# Patient Record
Sex: MALE | Race: WHITE | NOT HISPANIC OR LATINO | URBAN - METROPOLITAN AREA
[De-identification: names, ages, dates, MRNs, and addresses within clinical notes are randomized per-mention and may not be internally consistent; named-entity substitution may affect disease eponyms.]

---

## 2020-07-25 ENCOUNTER — TELEPHONE ENCOUNTER (OUTPATIENT)
Dept: URBAN - METROPOLITAN AREA CLINIC 13 | Facility: CLINIC | Age: 35
End: 2020-07-25

## 2020-07-25 RX ORDER — DIFENOXIN AND ATROPINE SULFATE .025; 1 MG/1; MG/1
TAKE 1 TABLET EVERY 4 TO 6 HOURS AS NEEDED TABLET ORAL
Qty: 40 | Refills: 1 | OUTPATIENT
Start: 2019-01-16 | End: 2019-02-06

## 2020-07-25 RX ORDER — MV,CA,MIN/IRON/FA/GUARANA/CAFF 9-400-200
TAKE 1 TABLET DAILY TABLET ORAL
Refills: 0 | OUTPATIENT
Start: 2009-05-21 | End: 2017-06-28

## 2020-07-25 RX ORDER — POLYETHYLENE GLYCOL 3350, SODIUM CHLORIDE, SODIUM BICARBONATE AND POTASSIUM CHLORIDE WITH LEMON FLAVOR 420; 11.2; 5.72; 1.48 G/4L; G/4L; G/4L; G/4L
USE AS DIRECTED POWDER, FOR SOLUTION ORAL
Qty: 1 | Refills: 0 | OUTPATIENT
Start: 2017-08-28 | End: 2017-10-02

## 2020-07-25 RX ORDER — RIFAXIMIN 550 MG/1
TAKE 1 TABLET 3 TIMES DAILY TABLET ORAL
Qty: 42 | Refills: 2 | OUTPATIENT
Start: 2018-11-15 | End: 2019-01-16

## 2020-07-25 RX ORDER — PANCRELIPASE 24000; 76000; 120000 [USP'U]/1; [USP'U]/1; [USP'U]/1
TK ONE C PO WITH THE FIRST BITES OF EACH MEAL CAPSULE, DELAYED RELEASE PELLETS ORAL
Qty: 120 | Refills: 0 | OUTPATIENT
Start: 2019-02-08 | End: 2019-09-25

## 2020-07-25 RX ORDER — DICYCLOMINE HYDROCHLORIDE 10 MG/1
TAKE 1 CAPSULE EVERY 6 HOURS AS NEEDED CAPSULE ORAL
Qty: 28 | Refills: 0 | OUTPATIENT
Start: 2017-06-28 | End: 2017-08-28

## 2020-07-25 RX ORDER — DICYCLOMINE HYDROCHLORIDE 20 MG/1
TAKE ONE TABLET BY MOUTH EVERY 6 HOURS AS NEEDED TABLET ORAL
Qty: 40 | Refills: 2 | OUTPATIENT
Start: 2018-12-21 | End: 2019-04-17

## 2020-07-25 RX ORDER — LISDEXAMFETAMINE DIMESYLATE 20 MG/1
TAKE 1 CAPSULE DAILY CAPSULE ORAL
Refills: 0 | OUTPATIENT
End: 2017-08-28

## 2020-07-25 RX ORDER — CHOLESTYRAMINE 4 G/9G
TAKE 1 PACKET DAILY AT BEDTIME POWDER, FOR SUSPENSION ORAL
Qty: 30 | Refills: 1 | OUTPATIENT
Start: 2019-09-14 | End: 2019-09-25

## 2020-07-25 RX ORDER — DICYCLOMINE HYDROCHLORIDE 10 MG/1
TAKE 1 CAPSULE EVERY 6 HOURS AS NEEDED CAPSULE ORAL
Qty: 40 | Refills: 0 | OUTPATIENT
Start: 2017-07-13 | End: 2018-12-21

## 2020-07-25 RX ORDER — AMITRIPTYLINE HYDROCHLORIDE 10 MG/1
TAKE 2 TABLETS BY MOUTH AT BEDTIME TABLET, FILM COATED ORAL
Qty: 60 | Refills: 2 | OUTPATIENT
Start: 2017-11-06 | End: 2019-01-16

## 2020-07-25 RX ORDER — BUSPIRONE HYDROCHLORIDE 5 MG/1
TAKE 1 TABLET TWICE DAILY TABLET ORAL
Qty: 60 | Refills: 2 | OUTPATIENT
Start: 2019-02-26 | End: 2019-03-14

## 2020-07-26 ENCOUNTER — TELEPHONE ENCOUNTER (OUTPATIENT)
Dept: URBAN - METROPOLITAN AREA CLINIC 13 | Facility: CLINIC | Age: 35
End: 2020-07-26

## 2020-07-26 RX ORDER — ARMODAFINIL 50 MG/1
TAKE 1 TABLET DAILY TABLET ORAL
Refills: 0 | Status: ACTIVE | COMMUNITY

## 2020-07-26 RX ORDER — IBUPROFEN 800 MG/1
TK 1 T PO Q 8 H PRN TABLET ORAL
Qty: 30 | Refills: 0 | Status: ACTIVE | COMMUNITY
Start: 2018-01-28

## 2020-07-26 RX ORDER — DESIPRAMINE 50 MG/1
TAKE 1/2 TABLET (25MG) PRIOR TO BEDTIME NIGHTLY X1 WEEK, THEN INCREASE TO 1 TABLET (50MG) NIGHTLY TABLET, FILM COATED ORAL
Qty: 30 | Refills: 5 | Status: ACTIVE | COMMUNITY
Start: 2019-09-25

## 2020-07-26 RX ORDER — PREDNISONE 20 MG/1
TAKE 1 TABLET BY MOUTH TWICE DAILY WITH FOOD TABLET ORAL
Qty: 10 | Refills: 0 | Status: ACTIVE | COMMUNITY
Start: 2018-01-30

## 2020-07-26 RX ORDER — BENZONATATE 200 MG/1
TAKE 1 CAPSULE BY MOUTH EVERY 8 HOURS AS NEEDED CAPSULE ORAL
Qty: 30 | Refills: 0 | Status: ACTIVE | COMMUNITY
Start: 2018-01-25

## 2020-07-26 RX ORDER — ARMODAFINIL 150 MG/1
TABLET ORAL
Qty: 30 | Refills: 0 | Status: ACTIVE | COMMUNITY
Start: 2019-09-12

## 2020-07-26 RX ORDER — HYOSCYAMINE SULFATE 0.12 MG/1
TAKE 1 TABLET BY MOUTH EVERY 6 HOURS AS NEEDED FOR ABDOMINAL PAIN TABLET ORAL
Qty: 30 | Refills: 5 | Status: ACTIVE | COMMUNITY
Start: 2018-10-24

## 2020-07-26 RX ORDER — LEVOFLOXACIN 500 MG/1
TAKE 1 TABLET BY MOUTH ONCE DAILY FOR 7 DAYS TABLET, FILM COATED ORAL
Qty: 7 | Refills: 0 | Status: ACTIVE | COMMUNITY
Start: 2018-01-30

## 2020-07-26 RX ORDER — AZITHROMYCIN DIHYDRATE 250 MG/1
TAKE 2 TABLETS BY MOUTH TODAY, THEN TAKE 1 TABLET DAILY FOR 4 DAYS TABLET, FILM COATED ORAL
Qty: 6 | Refills: 0 | Status: ACTIVE | COMMUNITY
Start: 2018-01-25

## 2020-07-26 RX ORDER — DIPHENOXYLATE HYDROCHLORIDE AND ATROPINE SULFATE 2.5; .025 MG/1; MG/1
TAKE 1 TABLET BY MOUTH EVERY 6 HOURS AS NEEDED TABLET ORAL
Qty: 120 | Refills: 0 | Status: ACTIVE | COMMUNITY
Start: 2019-01-22

## 2020-07-26 RX ORDER — TRIAMCINOLONE ACETONIDE 1 MG/ML
APP BID PRN FOR EXZEMA LOTION TOPICAL
Qty: 60 | Refills: 0 | Status: ACTIVE | COMMUNITY
Start: 2019-02-08

## 2020-07-26 RX ORDER — AMOXICILLIN AND CLAVULANATE POTASSIUM 875; 125 MG/1; MG/1
TABLET, FILM COATED ORAL
Qty: 14 | Refills: 0 | Status: ACTIVE | COMMUNITY
Start: 2019-11-04

## 2020-07-26 RX ORDER — CODEINE PHOSPHATE AND GUAIFENESIN 10; 100 MG/5ML; MG/5ML
TK 10 ML PO Q 4-6 H PRN LIQUID ORAL
Qty: 118 | Refills: 0 | Status: ACTIVE | COMMUNITY
Start: 2018-01-28

## 2020-07-26 RX ORDER — CHOLESTYRAMINE 4 G/9G
MIX THE CONTENTS OF 1 POWDER PACKET WITH 2-6 OZ OF NONCARBONATED BEVERAGE AND SWALLOW TWICE DAILY POWDER, FOR SUSPENSION ORAL
Qty: 60 | Refills: 5 | Status: ACTIVE | COMMUNITY
Start: 2019-09-25

## 2020-07-26 RX ORDER — ERYTHROMYCIN 5 MG/G
OINTMENT OPHTHALMIC
Qty: 4 | Refills: 0 | Status: ACTIVE | COMMUNITY
Start: 2019-02-05

## 2020-07-26 RX ORDER — SULFAMETHOXAZOLE AND TRIMETHOPRIM 800; 160 MG/1; MG/1
TABLET ORAL
Qty: 20 | Refills: 0 | Status: ACTIVE | COMMUNITY
Start: 2019-09-10

## 2020-07-26 RX ORDER — OSELTAMIVIR PHOSPHATE 75 MG/1
TK 1 C PO BID CAPSULE ORAL
Qty: 10 | Refills: 0 | Status: ACTIVE | COMMUNITY
Start: 2018-01-28

## 2020-07-26 RX ORDER — ERYTHROMYCIN 5 MG/G
APPLY OINTMENT TO AFFECTED AREA  TWICE DAILY FOR 5 DAYS OINTMENT OPHTHALMIC
Qty: 4 | Refills: 0 | Status: ACTIVE | COMMUNITY
Start: 2019-02-04

## 2020-07-26 RX ORDER — CHOLESTYRAMINE 4 G/9G
TAKE 1 PACKET DAILY POWDER, FOR SUSPENSION ORAL
Qty: 4 | Refills: 1 | Status: ACTIVE | COMMUNITY
Start: 2019-07-10

## 2020-07-26 RX ORDER — PANCRELIPASE 24000; 76000; 120000 [USP'U]/1; [USP'U]/1; [USP'U]/1
CAPSULE, DELAYED RELEASE PELLETS ORAL
Qty: 120 | Refills: 0 | Status: ACTIVE | COMMUNITY
Start: 2019-02-08

## 2020-07-26 RX ORDER — CEPHALEXIN 500 MG/1
CAPSULE ORAL
Qty: 10 | Refills: 0 | Status: ACTIVE | COMMUNITY
Start: 2019-02-04

## 2022-09-01 ENCOUNTER — WEB ENCOUNTER (OUTPATIENT)
Dept: URBAN - METROPOLITAN AREA CLINIC 107 | Facility: CLINIC | Age: 37
End: 2022-09-01

## 2022-09-07 ENCOUNTER — OFFICE VISIT (OUTPATIENT)
Dept: URBAN - METROPOLITAN AREA CLINIC 107 | Facility: CLINIC | Age: 37
End: 2022-09-07
Payer: COMMERCIAL

## 2022-09-07 ENCOUNTER — TELEPHONE ENCOUNTER (OUTPATIENT)
Dept: URBAN - METROPOLITAN AREA CLINIC 113 | Facility: CLINIC | Age: 37
End: 2022-09-07

## 2022-09-07 VITALS
DIASTOLIC BLOOD PRESSURE: 89 MMHG | TEMPERATURE: 98.4 F | RESPIRATION RATE: 18 BRPM | HEART RATE: 103 BPM | BODY MASS INDEX: 19.85 KG/M2 | HEIGHT: 69 IN | SYSTOLIC BLOOD PRESSURE: 125 MMHG | WEIGHT: 134 LBS

## 2022-09-07 DIAGNOSIS — Z80.0 FAMILY HISTORY OF COLON CANCER IN FATHER: ICD-10-CM

## 2022-09-07 DIAGNOSIS — K21.9 GERD WITHOUT ESOPHAGITIS: ICD-10-CM

## 2022-09-07 DIAGNOSIS — E73.9 LACTOSE INTOLERANCE: ICD-10-CM

## 2022-09-07 DIAGNOSIS — K58.0 IRRITABLE BOWEL SYNDROME WITH DIARRHEA: ICD-10-CM

## 2022-09-07 PROCEDURE — 99213 OFFICE O/P EST LOW 20 MIN: CPT | Performed by: INTERNAL MEDICINE

## 2022-09-07 RX ORDER — ARMODAFINIL 50 MG/1
TAKE 1 TABLET DAILY TABLET ORAL BID
Refills: 0 | Status: ACTIVE | COMMUNITY

## 2022-09-07 RX ORDER — PANTOPRAZOLE SODIUM 40 MG/1
1 TABLET TABLET, DELAYED RELEASE ORAL ONCE A DAY
Status: ACTIVE | COMMUNITY

## 2022-09-07 RX ORDER — SOLRIAMFETOL 75 MG/1
1 TABLET IN THE MORNING TABLET, FILM COATED ORAL ONCE A DAY
Status: ACTIVE | COMMUNITY

## 2022-09-07 RX ORDER — CALCIUM CARBONATE 500 MG/1
1 TABLET TABLET ORAL ONCE A DAY
Status: ACTIVE | COMMUNITY

## 2022-09-07 RX ORDER — PANTOPRAZOLE SODIUM 40 MG/1
1 TABLET TABLET, DELAYED RELEASE ORAL ONCE A DAY
Qty: 30 | Refills: 3 | OUTPATIENT
Start: 2022-09-07

## 2022-09-07 RX ORDER — DESIPRAMINE 50 MG/1
1 TABLET TABLET, FILM COATED ORAL ONCE A DAY
Refills: 5 | Status: ACTIVE | COMMUNITY
Start: 2019-09-25

## 2022-09-07 NOTE — HPI-TODAY'S VISIT:
37-year-old with history of gastroesophageal reflux disease and irritable bowel syndrome with diarrhea.  He has had an extensive work-up in the past including urine negative for 5 HIAA, normal cortisol, gastrin calcitonin immunoglobulins.  VIP level was normal chromogranin A was less than 1.  He subsequently went to the medical [facility] in [place] and was evaluated by Dr. Girard.  I do not have the results of his evaluation.  Patient tells me that he was found to be lactose intolerant and has irritable bowel syndrome with diarrhea without any other diagnoses.  They have kept him on desipramine.  He did have 6 biotherapy sessions as well.  He has an appointment with Dr. Girard in November.  He did have a colonoscopy by Dr. Girard last year on 7/2021.  His father was diagnosed with colon cancer at age 63.  He typically has a bowel movement every day to every other day but soft.  He noticed blood a few months ago for a couple days but since has had no blood or melena.  There is no nausea or vomiting fevers or chills.  He denies any abdominal pain.  He was starting to get heartburn and was started on pantoprazole and that has helped.  He gets breakthrough symptoms about 4 times per week for which he uses Tums that helps incompletely.  He's had no dysphagia. CBC on 7/12/2022 revealed a hemoglobin of 14.5 WBC of 5.8 and platelet count of 180,000.

## 2022-09-07 NOTE — HPI-OTHER HISTORIES
Colonoscopy on 10/2/2017 revealed internal and external hemorrhoids, normal terminal ileum, normal colon.  Random biopsies were unremarkable.  EGD on 10/23/2018 revealed 2 inlet patches of gastric mucosa in the proximal esophagus normal Z-line at 43 cm random esophageal biopsies revealed no eosinophilic esophagitis.  There was one tongue of erythema at the GE junction biopsies revealed inflammation negative for Redman's mucosa.  There was some erythema the gastric antrum and body biopsies revealed foveolar hyperplasia negative for H. pylori.  The duodenum was normal biopsies revealed no pathologic abnormality. H. pylori breath test was negative

## 2022-09-07 NOTE — PHYSICAL EXAM CHEST:
PT. WAS IRRITABLE THIS MORNING, CIWA WAS DISCONTINUED. PT. DENIED ANY SI/HI/AVH. PT. HAS REQUESTED PRN VISTARIL SEVERAL TIMES FOR ANXIETY.   chest wall non-tender, breathing is unlabored without accessory muscle use, normal breath sounds

## 2022-10-18 ENCOUNTER — OFFICE VISIT (OUTPATIENT)
Dept: URBAN - METROPOLITAN AREA CLINIC 113 | Facility: CLINIC | Age: 37
End: 2022-10-18

## 2022-11-21 ENCOUNTER — OFFICE VISIT (OUTPATIENT)
Dept: URBAN - METROPOLITAN AREA CLINIC 113 | Facility: CLINIC | Age: 37
End: 2022-11-21
Payer: COMMERCIAL

## 2022-11-21 VITALS
RESPIRATION RATE: 18 BRPM | HEART RATE: 90 BPM | DIASTOLIC BLOOD PRESSURE: 78 MMHG | BODY MASS INDEX: 20.73 KG/M2 | WEIGHT: 140 LBS | SYSTOLIC BLOOD PRESSURE: 130 MMHG | HEIGHT: 69 IN | TEMPERATURE: 98.7 F

## 2022-11-21 DIAGNOSIS — Z80.0 FAMILY HISTORY OF COLON CANCER IN FATHER: ICD-10-CM

## 2022-11-21 DIAGNOSIS — K21.9 GERD WITHOUT ESOPHAGITIS: ICD-10-CM

## 2022-11-21 DIAGNOSIS — E73.9 LACTOSE INTOLERANCE: ICD-10-CM

## 2022-11-21 DIAGNOSIS — K58.0 IRRITABLE BOWEL SYNDROME WITH DIARRHEA: ICD-10-CM

## 2022-11-21 PROCEDURE — 99213 OFFICE O/P EST LOW 20 MIN: CPT | Performed by: INTERNAL MEDICINE

## 2022-11-21 RX ORDER — GABAPENTIN 100 MG/1
1 CAPSULE CAPSULE ORAL ONCE A DAY
Status: ACTIVE | COMMUNITY

## 2022-11-21 RX ORDER — VALACYCLOVIR 1 G/1
1 TABLET TABLET, FILM COATED ORAL ONCE A DAY
Status: ACTIVE | COMMUNITY

## 2022-11-21 RX ORDER — PANTOPRAZOLE SODIUM 40 MG/1
1 TABLET TABLET, DELAYED RELEASE ORAL ONCE A DAY
Qty: 30 | Refills: 3 | Status: ACTIVE | COMMUNITY
Start: 2022-09-07

## 2022-11-21 RX ORDER — SOLRIAMFETOL 150 MG/1
1 TABLET IN THE MORNING TABLET, FILM COATED ORAL ONCE A DAY
Status: ACTIVE | COMMUNITY

## 2022-11-21 RX ORDER — DESIPRAMINE 50 MG/1
1 TABLET TABLET, FILM COATED ORAL ONCE A DAY
Refills: 5 | Status: ACTIVE | COMMUNITY
Start: 2019-09-25

## 2022-11-21 RX ORDER — ALUMINUM HYDROXIDE AND MAGNESIUM CARBONATE 95; 358 MG/15ML; MG/15ML
15 ML AFTER MEALS AND AT BEDTIME AS NEEDED LIQUID ORAL
Status: ACTIVE | COMMUNITY

## 2022-11-21 RX ORDER — PANTOPRAZOLE SODIUM 40 MG/1
1 TABLET TABLET, DELAYED RELEASE ORAL ONCE A DAY
Status: ACTIVE | COMMUNITY

## 2022-11-21 RX ORDER — CALCIUM CARBONATE 500 MG/1
1 TABLET TABLET ORAL ONCE A DAY
Status: ON HOLD | COMMUNITY

## 2022-11-21 NOTE — HPI-OTHER HISTORIES
EGD by Dr. Girard on 8/28/2019 revealed normal esophagus no Redman's esophagus Z-line at 44 cm.  There is some erythema the gastric antrum and body biopsies revealed mild inactive gastritis negative for H. pylori.  There was some bile present in the stomach.  The duodenum was normal biopsies revealed no celiac disease mild villous blunting with no intraepithelial lymphocytes, intact villi. Flexible sigmoidoscopy on 8/28/2019 revealed moderate external hemorrhoids, normal colon to the distal transverse colon.  Biopsies revealed no pathologic abnormality.  Colonoscopy on 10/2/2017 revealed internal and external hemorrhoids, normal terminal ileum, normal colon.  Random biopsies were unremarkable.  EGD on 10/23/2018 revealed 2 inlet patches of gastric mucosa in the proximal esophagus normal Z-line at 43 cm random esophageal biopsies revealed no eosinophilic esophagitis.  There was one tongue of erythema at the GE junction biopsies revealed inflammation negative for Redman's mucosa.  There was some erythema the gastric antrum and body biopsies revealed foveolar hyperplasia negative for H. pylori.  The duodenum was normal biopsies revealed no pathologic abnormality. H. pylori breath test was negative

## 2022-11-21 NOTE — HPI-TODAY'S VISIT:
37-year-old with history of gastroesophageal reflux disease and irritable bowel syndrome with diarrhea.  He has had an extensive work-up in the past including urine negative for 5 HIAA, normal cortisol, gastrin calcitonin immunoglobulins.  VIP level was normal chromogranin A was less than 1.  He subsequently went to the medical [facility] in [place] and was evaluated by Dr. Girard.  I do not have the results of his evaluation.  Patient tells me that he was found to be lactose intolerant and has irritable bowel syndrome with diarrhea without any other diagnoses.  They have kept him on desipramine.  He did have 6 biotherapy sessions as well.   He did have a colonoscopy by Dr. Girard last year on 7/2021Which showed hemorrhoids but was otherwise unremarkable.  I have not been able to obtain that report.  He had banding of his hemorrhoids at the same time.  His father was diagnosed with colon cancer at age 63.    He is doing quite well.  He has noticed that when he has increased stress-type knots in her stomach that last a couple days.  He currently has no abdominal pain.  He has not had to use Gaviscon in over a month.  There is no excess heartburn.  He denies any dysphagia.  On November 5 he had shingles.  He has some nausea since starting medication for his shingles.  This been no vomiting.  He typically is having one bowel movement usually in the morning.  He had an episode of blood a week ago but has had none since.  No melena. CBC on 7/12/2022 revealed a hemoglobin of 14.5 WBC of 5.8 and platelet count of 180,000.

## 2022-12-08 ENCOUNTER — TELEPHONE ENCOUNTER (OUTPATIENT)
Dept: URBAN - METROPOLITAN AREA CLINIC 113 | Facility: CLINIC | Age: 37
End: 2022-12-08

## 2022-12-08 RX ORDER — PANTOPRAZOLE SODIUM 40 MG/1
1 TABLET TABLET, DELAYED RELEASE ORAL ONCE A DAY
Qty: 90 | Refills: 3

## 2022-12-31 ENCOUNTER — WEB ENCOUNTER (OUTPATIENT)
Dept: URBAN - METROPOLITAN AREA CLINIC 113 | Facility: CLINIC | Age: 37
End: 2022-12-31

## 2023-02-22 ENCOUNTER — OFFICE VISIT (OUTPATIENT)
Dept: URBAN - METROPOLITAN AREA CLINIC 113 | Facility: CLINIC | Age: 38
End: 2023-02-22
Payer: COMMERCIAL

## 2023-02-22 VITALS
BODY MASS INDEX: 21.03 KG/M2 | HEART RATE: 90 BPM | WEIGHT: 142 LBS | TEMPERATURE: 97.7 F | HEIGHT: 69 IN | RESPIRATION RATE: 16 BRPM | DIASTOLIC BLOOD PRESSURE: 86 MMHG | SYSTOLIC BLOOD PRESSURE: 134 MMHG

## 2023-02-22 DIAGNOSIS — E73.9 LACTOSE INTOLERANCE: ICD-10-CM

## 2023-02-22 DIAGNOSIS — K58.0 IRRITABLE BOWEL SYNDROME WITH DIARRHEA: ICD-10-CM

## 2023-02-22 DIAGNOSIS — Z80.0 FAMILY HISTORY OF COLON CANCER IN FATHER: ICD-10-CM

## 2023-02-22 DIAGNOSIS — K21.9 GERD WITHOUT ESOPHAGITIS: ICD-10-CM

## 2023-02-22 PROBLEM — 312824007: Status: ACTIVE | Noted: 2022-09-07

## 2023-02-22 PROBLEM — 782415009: Status: ACTIVE | Noted: 2022-09-07

## 2023-02-22 PROBLEM — 197125005: Status: ACTIVE | Noted: 2022-09-07

## 2023-02-22 PROBLEM — 266435005: Status: ACTIVE | Noted: 2022-09-06

## 2023-02-22 PROCEDURE — 99213 OFFICE O/P EST LOW 20 MIN: CPT | Performed by: INTERNAL MEDICINE

## 2023-02-22 RX ORDER — PANTOPRAZOLE SODIUM 40 MG/1
1 TABLET TABLET, DELAYED RELEASE ORAL ONCE A DAY
Qty: 90 | Refills: 3 | Status: ACTIVE | COMMUNITY

## 2023-02-22 RX ORDER — PANTOPRAZOLE SODIUM 40 MG/1
1 TABLET TABLET, DELAYED RELEASE ORAL ONCE A DAY
Qty: 30 | Refills: 3 | Status: ACTIVE | COMMUNITY
Start: 2022-09-07

## 2023-02-22 RX ORDER — GABAPENTIN 300 MG/1
1 CAPSULE CAPSULE ORAL ONCE A DAY
Status: ACTIVE | COMMUNITY

## 2023-02-22 RX ORDER — ALUMINUM HYDROXIDE AND MAGNESIUM CARBONATE 95; 358 MG/15ML; MG/15ML
15 ML AFTER MEALS AND AT BEDTIME AS NEEDED LIQUID ORAL
Status: ACTIVE | COMMUNITY

## 2023-02-22 RX ORDER — ARMODAFINIL 150 MG/1
1 TABLET TABLET ORAL ONCE A DAY
Status: ACTIVE | COMMUNITY

## 2023-02-22 RX ORDER — CALCIUM CARBONATE 500 MG/1
1 TABLET TABLET ORAL ONCE A DAY
Status: ON HOLD | COMMUNITY

## 2023-02-22 RX ORDER — SOLRIAMFETOL 150 MG/1
1 TABLET IN THE MORNING TABLET, FILM COATED ORAL ONCE A DAY
Status: ACTIVE | COMMUNITY

## 2023-02-22 RX ORDER — FLUTICASONE PROPIONATE 50 UG/1
1 SPRAY IN EACH NOSTRIL SPRAY, METERED NASAL ONCE A DAY
Status: ACTIVE | COMMUNITY

## 2023-02-22 RX ORDER — VALACYCLOVIR 1 G/1
1 TABLET TABLET, FILM COATED ORAL ONCE A DAY
Status: ON HOLD | COMMUNITY

## 2023-02-22 RX ORDER — DESIPRAMINE HYDROCHLORIDE 75 MG/1
1 TABLET TABLET, FILM COATED ORAL ONCE A DAY
Refills: 5 | Status: ACTIVE | COMMUNITY
Start: 2019-09-25

## 2023-02-22 NOTE — HPI-TODAY'S VISIT:
38-year-old with history of gastroesophageal reflux disease and irritable bowel syndrome with diarrhea.  He has had an extensive work-up in the past including urine negative for 5 HIAA, normal cortisol, gastrin calcitonin immunoglobulins.  VIP level was normal chromogranin A was less than 1.  He subsequently went to the medical [facility] in [place] and was evaluated by Dr. Girard.  I do not have the results of his evaluation.  Patient tells me that he was found to be lactose intolerant and has irritable bowel syndrome with diarrhea without any other diagnoses.  They have kept him on desipramine.  He did have 6 biotherapy sessions as well.   He did have a colonoscopy by Dr. Girard last year on 7/2021Which showed hemorrhoids but was otherwise unremarkable.  I have not been able to obtain that report.  He had banding of his hemorrhoids at the same time.  His father was diagnosed with colon cancer at age 63.   He is doing much better.  He is used Gaviscon only once in the last few months.  He denies any dysphagia.  He's had some increased stress in his life.  He has habits some mild left-sided abdominal discomfort.  He has no nausea or vomiting.  He noticed a little bright red blood per rectum when he wipes but that has resolved with Preparation H.  He typically has one bowel movement per day there's been no melena.  CBC on 7/12/2022 revealed a hemoglobin of 14.5 WBC of 5.8 and platelet count of 180,000.

## 2023-02-23 PROBLEM — 446081009: Status: ACTIVE | Noted: 2023-02-23

## 2023-02-23 PROBLEM — 179950008: Status: ACTIVE | Noted: 2023-02-23

## 2023-08-19 ENCOUNTER — WEB ENCOUNTER (OUTPATIENT)
Dept: URBAN - METROPOLITAN AREA CLINIC 113 | Facility: CLINIC | Age: 38
End: 2023-08-19

## 2023-08-24 ENCOUNTER — OFFICE VISIT (OUTPATIENT)
Dept: URBAN - METROPOLITAN AREA CLINIC 113 | Facility: CLINIC | Age: 38
End: 2023-08-24
Payer: COMMERCIAL

## 2023-08-24 VITALS
HEART RATE: 98 BPM | SYSTOLIC BLOOD PRESSURE: 126 MMHG | WEIGHT: 143 LBS | BODY MASS INDEX: 21.18 KG/M2 | RESPIRATION RATE: 14 BRPM | HEIGHT: 69 IN | DIASTOLIC BLOOD PRESSURE: 82 MMHG | TEMPERATURE: 97.7 F

## 2023-08-24 DIAGNOSIS — E73.9 LACTOSE INTOLERANCE: ICD-10-CM

## 2023-08-24 DIAGNOSIS — K21.9 GERD WITHOUT ESOPHAGITIS: ICD-10-CM

## 2023-08-24 DIAGNOSIS — K58.0 IRRITABLE BOWEL SYNDROME WITH DIARRHEA: ICD-10-CM

## 2023-08-24 DIAGNOSIS — Z80.0 FAMILY HISTORY OF COLON CANCER IN FATHER: ICD-10-CM

## 2023-08-24 PROCEDURE — 99213 OFFICE O/P EST LOW 20 MIN: CPT | Performed by: INTERNAL MEDICINE

## 2023-08-24 RX ORDER — VALACYCLOVIR 1 G/1
1 TABLET TABLET, FILM COATED ORAL ONCE A DAY
Status: ON HOLD | COMMUNITY

## 2023-08-24 RX ORDER — SOLRIAMFETOL 150 MG/1
1 TABLET IN THE MORNING TABLET, FILM COATED ORAL ONCE A DAY
Status: ACTIVE | COMMUNITY

## 2023-08-24 RX ORDER — GABAPENTIN 100 MG/1
1 CAPSULE CAPSULE ORAL ONCE A DAY
Status: ACTIVE | COMMUNITY

## 2023-08-24 RX ORDER — FLUTICASONE PROPIONATE 50 UG/1
1 SPRAY IN EACH NOSTRIL SPRAY, METERED NASAL ONCE A DAY
Status: ACTIVE | COMMUNITY

## 2023-08-24 RX ORDER — CALCIUM CARBONATE 500 MG/1
1 TABLET TABLET ORAL ONCE A DAY
Status: ON HOLD | COMMUNITY

## 2023-08-24 RX ORDER — ARMODAFINIL 150 MG/1
1 TABLET TABLET ORAL ONCE A DAY
Status: ACTIVE | COMMUNITY

## 2023-08-24 RX ORDER — ALUMINUM HYDROXIDE AND MAGNESIUM CARBONATE 95; 358 MG/15ML; MG/15ML
15 ML AFTER MEALS AND AT BEDTIME AS NEEDED LIQUID ORAL
Status: ACTIVE | COMMUNITY

## 2023-08-24 RX ORDER — PANTOPRAZOLE SODIUM 40 MG/1
1 TABLET TABLET, DELAYED RELEASE ORAL ONCE A DAY
Qty: 90 | Refills: 3 | Status: ON HOLD | COMMUNITY

## 2023-08-24 RX ORDER — PANTOPRAZOLE SODIUM 40 MG/1
1 TABLET TABLET, DELAYED RELEASE ORAL ONCE A DAY
Qty: 30 | Refills: 3 | Status: ACTIVE | COMMUNITY
Start: 2022-09-07

## 2023-08-24 RX ORDER — DESIPRAMINE HYDROCHLORIDE 75 MG/1
1 TABLET TABLET, FILM COATED ORAL ONCE A DAY
Refills: 5 | Status: ACTIVE | COMMUNITY
Start: 2019-09-25

## 2023-08-24 NOTE — HPI-TODAY'S VISIT:
38-year-old with history of gastroesophageal reflux disease and irritable bowel syndrome with diarrhea.  He has had an extensive work-up in the past including urine negative for 5 HIAA, normal cortisol, gastrin calcitonin immunoglobulins.  VIP level was normal chromogranin A was less than 1.  He subsequently went to the medical [facility] in [place] and was evaluated by Dr. Girard.  I do not have the results of his evaluation.  Patient tells me that he was found to be lactose intolerant and has irritable bowel syndrome with diarrhea without any other diagnoses.  They have kept him on desipramine.  He did have 6 biotherapy sessions as well.   He did have a colonoscopy by Dr. Girard last year on 7/2021Which showed hemorrhoids but was otherwise unremarkable.  I have not been able to obtain that report.  He had banding of his hemorrhoids at the same time.  His father was diagnosed with colon cancer at age 63.   He has started a new job as a  for a [employer] company.  In addition his wife has had some cardiac issues and she is undergoing testing currently.  There has been a little increased stress in his life recently.  He has heartburn breakthrough about 2-3 times per week and Gaviscon seems to take care of it.  He has had no dysphagia.  Sometimes he has some left lower quadrant discomfort that is resolved after a bowel movement.  He has had no nausea or vomiting or fevers or chills.  He moves his bowels about every day to every other day and sometimes he can strain.  He did have 1 episode of bleeding on the toilet paper this week but that has now resolved.  There is been no melena.  Blood work on 4/12/2023 revealed a hemoglobin of 14.9, WBC of 6.7 and platelet count 244,000.  B12 is 339.  Sodium 143 potassium 4 BUN 12 creatinine 0.82.  AST 19, ALT 19, alkaline phosphatase 92, total bili 0.3.  Albumin is 4.8.

## 2023-11-18 ENCOUNTER — WEB ENCOUNTER (OUTPATIENT)
Dept: URBAN - METROPOLITAN AREA CLINIC 113 | Facility: CLINIC | Age: 38
End: 2023-11-18

## 2023-11-18 RX ORDER — DESIPRAMINE HYDROCHLORIDE 75 MG/1
1 TABLET TABLET, FILM COATED ORAL ONCE A DAY
Qty: 90 TABLET | Refills: 3
Start: 2019-09-25

## 2023-12-28 ENCOUNTER — WEB ENCOUNTER (OUTPATIENT)
Dept: URBAN - METROPOLITAN AREA CLINIC 113 | Facility: CLINIC | Age: 38
End: 2023-12-28

## 2023-12-28 RX ORDER — PANTOPRAZOLE SODIUM 40 MG/1
1 TABLET TABLET, DELAYED RELEASE ORAL ONCE A DAY
Qty: 90 | Refills: 3

## 2024-02-28 ENCOUNTER — OV EP (OUTPATIENT)
Dept: URBAN - METROPOLITAN AREA CLINIC 113 | Facility: CLINIC | Age: 39
End: 2024-02-28

## 2024-02-29 ENCOUNTER — OV EP (OUTPATIENT)
Dept: URBAN - METROPOLITAN AREA CLINIC 113 | Facility: CLINIC | Age: 39
End: 2024-02-29
Payer: COMMERCIAL

## 2024-02-29 VITALS
DIASTOLIC BLOOD PRESSURE: 80 MMHG | BODY MASS INDEX: 22.07 KG/M2 | HEART RATE: 101 BPM | SYSTOLIC BLOOD PRESSURE: 133 MMHG | TEMPERATURE: 97.3 F | WEIGHT: 149 LBS | HEIGHT: 69 IN | RESPIRATION RATE: 16 BRPM

## 2024-02-29 DIAGNOSIS — K21.9 GERD WITHOUT ESOPHAGITIS: ICD-10-CM

## 2024-02-29 DIAGNOSIS — E73.9 LACTOSE INTOLERANCE: ICD-10-CM

## 2024-02-29 DIAGNOSIS — K58.0 IRRITABLE BOWEL SYNDROME WITH DIARRHEA: ICD-10-CM

## 2024-02-29 DIAGNOSIS — Z80.0 FAMILY HISTORY OF COLON CANCER IN FATHER: ICD-10-CM

## 2024-02-29 PROCEDURE — 99202 OFFICE O/P NEW SF 15 MIN: CPT | Performed by: INTERNAL MEDICINE

## 2024-02-29 RX ORDER — SOLRIAMFETOL 150 MG/1
1 TABLET IN THE MORNING TABLET, FILM COATED ORAL ONCE A DAY
Status: ON HOLD | COMMUNITY

## 2024-02-29 RX ORDER — PANTOPRAZOLE SODIUM 40 MG/1
1 TABLET TABLET, DELAYED RELEASE ORAL ONCE A DAY
Qty: 30 | Refills: 3 | Status: ACTIVE | COMMUNITY
Start: 2022-09-07

## 2024-02-29 RX ORDER — PANTOPRAZOLE SODIUM 40 MG/1
1 TABLET TABLET, DELAYED RELEASE ORAL ONCE A DAY
Qty: 90 TABLET | Refills: 3 | OUTPATIENT
Start: 2024-02-29

## 2024-02-29 RX ORDER — ALUMINUM HYDROXIDE AND MAGNESIUM CARBONATE 95; 358 MG/15ML; MG/15ML
15 ML AFTER MEALS AND AT BEDTIME AS NEEDED LIQUID ORAL
Status: ACTIVE | COMMUNITY

## 2024-02-29 RX ORDER — FLUTICASONE PROPIONATE 50 UG/1
1 SPRAY IN EACH NOSTRIL SPRAY, METERED NASAL ONCE A DAY
Status: ACTIVE | COMMUNITY

## 2024-02-29 RX ORDER — PANTOPRAZOLE SODIUM 40 MG/1
1 TABLET TABLET, DELAYED RELEASE ORAL ONCE A DAY
Qty: 90 | Refills: 3 | Status: ACTIVE | COMMUNITY

## 2024-02-29 RX ORDER — DESIPRAMINE HYDROCHLORIDE 75 MG/1
1 TABLET TABLET, FILM COATED ORAL ONCE A DAY
Qty: 90 TABLET | Refills: 3 | Status: ACTIVE | COMMUNITY
Start: 2019-09-25

## 2024-02-29 RX ORDER — GABAPENTIN 100 MG/1
1 CAPSULE CAPSULE ORAL ONCE A DAY
Status: ON HOLD | COMMUNITY

## 2024-02-29 RX ORDER — ARMODAFINIL 200 MG/1
1 TABLET TABLET ORAL ONCE A DAY
Status: ACTIVE | COMMUNITY

## 2024-02-29 RX ORDER — CALCIUM CARBONATE 500 MG/1
1 TABLET TABLET ORAL ONCE A DAY
Status: ON HOLD | COMMUNITY

## 2024-02-29 RX ORDER — VALACYCLOVIR 1 G/1
1 TABLET TABLET, FILM COATED ORAL ONCE A DAY
Status: ON HOLD | COMMUNITY

## 2024-02-29 NOTE — HPI-TODAY'S VISIT:
39-year-old with history of gastroesophageal reflux disease and irritable bowel syndrome with diarrhea.  He has had an extensive work-up in the past including urine negative for 5 HIAA, normal cortisol, gastrin calcitonin immunoglobulins.  VIP level was normal chromogranin A was less than 1.  He subsequently went to the medical [facility] in [place] and was evaluated by Dr. Girard.  I do not have the results of his evaluation.  Patient tells me that he was found to be lactose intolerant and has irritable bowel syndrome with diarrhea without any other diagnoses.  They have kept him on desipramine.  He did have 6 biotherapy sessions as well.   He did have a colonoscopy by Dr. Girard last year on 7/2021 which showed hemorrhoids but was otherwise unremarkable.  I have not been able to obtain that report.  He had banding of his hemorrhoids at the same time.  His father was diagnosed with colon cancer at age 63.  Overall he was doing well.  His mail order for pantoprazole got lost in the mail so he had to use some Pepcid and Gaviscon and has been having a lot more heartburn symptoms.  He denies any dysphagia or abdominal pain.  He has typically 1 bowel movement a day but he does wake frequently to clean.  Occasionally he will see some blood only on the toilet paper but never in the toilet bowl.  There is been no melena, nausea or vomiting or weight loss.  He does get some abdominal cramps right before he has a bowel movement and then shortly afterwards it lasts just for a brief time  Blood work on 10/25/2023 revealed a hemoglobin 14.6, WBC 6.8 and platelet count 234,000.  B12 level is 803.  Sodium 143 potassium 4.4 BUN 7 creatinine 0.99.  AST 18, ALT 14, alkaline phosphatase 94, total bili less than 0.2.

## 2024-10-06 ENCOUNTER — WEB ENCOUNTER (OUTPATIENT)
Dept: URBAN - METROPOLITAN AREA CLINIC 113 | Facility: CLINIC | Age: 39
End: 2024-10-06

## 2024-10-11 ENCOUNTER — WEB ENCOUNTER (OUTPATIENT)
Dept: URBAN - METROPOLITAN AREA CLINIC 113 | Facility: CLINIC | Age: 39
End: 2024-10-11

## 2024-10-11 RX ORDER — PANTOPRAZOLE SODIUM 40 MG/1
1 TABLET TABLET, DELAYED RELEASE ORAL ONCE A DAY
Qty: 90 TABLET | Refills: 3
Start: 2024-02-29

## 2024-11-15 ENCOUNTER — OFFICE VISIT (OUTPATIENT)
Dept: URBAN - METROPOLITAN AREA CLINIC 113 | Facility: CLINIC | Age: 39
End: 2024-11-15
Payer: COMMERCIAL

## 2024-11-15 ENCOUNTER — DASHBOARD ENCOUNTERS (OUTPATIENT)
Age: 39
End: 2024-11-15

## 2024-11-15 VITALS
SYSTOLIC BLOOD PRESSURE: 123 MMHG | TEMPERATURE: 97.1 F | WEIGHT: 140.2 LBS | RESPIRATION RATE: 20 BRPM | DIASTOLIC BLOOD PRESSURE: 80 MMHG | BODY MASS INDEX: 20.76 KG/M2 | HEIGHT: 69 IN | HEART RATE: 111 BPM

## 2024-11-15 DIAGNOSIS — E73.9 LACTOSE INTOLERANCE: ICD-10-CM

## 2024-11-15 DIAGNOSIS — K58.0 IRRITABLE BOWEL SYNDROME WITH DIARRHEA: ICD-10-CM

## 2024-11-15 DIAGNOSIS — Z80.0 FAMILY HISTORY OF COLON CANCER IN FATHER: ICD-10-CM

## 2024-11-15 DIAGNOSIS — K21.9 GERD WITHOUT ESOPHAGITIS: ICD-10-CM

## 2024-11-15 PROCEDURE — 99213 OFFICE O/P EST LOW 20 MIN: CPT | Performed by: INTERNAL MEDICINE

## 2024-11-15 RX ORDER — FLUTICASONE PROPIONATE 50 UG/1
1 SPRAY IN EACH NOSTRIL SPRAY, METERED NASAL ONCE A DAY
Status: ACTIVE | COMMUNITY

## 2024-11-15 RX ORDER — PANTOPRAZOLE SODIUM 40 MG/1
1 TABLET TABLET, DELAYED RELEASE ORAL ONCE A DAY
Qty: 90 | Refills: 3 | Status: ON HOLD | COMMUNITY

## 2024-11-15 RX ORDER — CALCIUM CARBONATE 500 MG/1
1 TABLET TABLET ORAL ONCE A DAY
Status: ON HOLD | COMMUNITY

## 2024-11-15 RX ORDER — GABAPENTIN 100 MG/1
1 CAPSULE CAPSULE ORAL ONCE A DAY
Status: ON HOLD | COMMUNITY

## 2024-11-15 RX ORDER — LORATADINE 10 MG/1
1 TABLET TABLET ORAL ONCE A DAY
Status: ACTIVE | COMMUNITY

## 2024-11-15 RX ORDER — DESIPRAMINE HYDROCHLORIDE 75 MG/1
1 TABLET TABLET ORAL ONCE A DAY
Qty: 90 | Refills: 3
Start: 2019-09-25

## 2024-11-15 RX ORDER — DESIPRAMINE HYDROCHLORIDE 75 MG/1
1 TABLET TABLET ORAL ONCE A DAY
Qty: 90 TABLET | Refills: 3 | Status: ACTIVE | COMMUNITY
Start: 2019-09-25

## 2024-11-15 RX ORDER — PANTOPRAZOLE SODIUM 40 MG/1
1 TABLET TABLET, DELAYED RELEASE ORAL ONCE A DAY
Qty: 90 TABLET | Refills: 3 | Status: ACTIVE | COMMUNITY
Start: 2024-02-29

## 2024-11-15 RX ORDER — PANTOPRAZOLE SODIUM 20 MG/1
1 TABLET TABLET, DELAYED RELEASE ORAL ONCE A DAY
Qty: 90 TABLET | Refills: 3 | OUTPATIENT

## 2024-11-15 RX ORDER — ALUMINUM HYDROXIDE AND MAGNESIUM CARBONATE 95; 358 MG/15ML; MG/15ML
15 ML AFTER MEALS AND AT BEDTIME AS NEEDED LIQUID ORAL
Status: ACTIVE | COMMUNITY

## 2024-11-15 RX ORDER — VALACYCLOVIR 1 G/1
1 TABLET TABLET, FILM COATED ORAL ONCE A DAY
Status: ON HOLD | COMMUNITY

## 2024-11-15 RX ORDER — SOLRIAMFETOL 150 MG/1
1 TABLET IN THE MORNING TABLET, FILM COATED ORAL ONCE A DAY
Status: ACTIVE | COMMUNITY

## 2024-11-15 RX ORDER — ARMODAFINIL 50 MG/1
1 TABLET TABLET ORAL ONCE A DAY
Status: ACTIVE | COMMUNITY

## 2024-11-15 RX ORDER — PANTOPRAZOLE SODIUM 40 MG/1
1 TABLET TABLET, DELAYED RELEASE ORAL ONCE A DAY
Qty: 30 | Refills: 3 | Status: ON HOLD | COMMUNITY
Start: 2022-09-07

## 2024-11-15 NOTE — HPI-TODAY'S VISIT:
39-year-old with history of gastroesophageal reflux disease and irritable bowel syndrome with diarrhea.  He has had an extensive work-up in the past including urine negative for 5 HIAA, normal cortisol, gastrin calcitonin immunoglobulins.  VIP level was normal chromogranin A was less than 1.  He subsequently went to the medical [facility] in [place] and was evaluated by Dr. Girard.  I do not have the results of his evaluation.  Patient tells me that he was found to be lactose intolerant and has irritable bowel syndrome with diarrhea without any other diagnoses.  They have kept him on desipramine.  He did have 6 biotherapy sessions as well.   He did have a colonoscopy by Dr. Girard last year on 7/2021 which showed hemorrhoids but was otherwise unremarkable.  I have not been able to obtain that report.  He had banding of his hemorrhoids at the same time.  His father was diagnosed with colon cancer at age 63.  He is doing extremely well from a GI point of view.  He denies any heartburn symptoms on pantoprazole 40 mg p.o. daily.  There is no dysphagia.  He occasionally gets some left lower quadrant discomfort maybe twice a week sometimes in the morning unrelated to meals.  It goes away on its own.  There is no nausea or vomiting.  He does move his bowels every day.  Sometimes he can have some blood on the toilet paper in the toilet bowl when he strains.  There is been no melena.  Blood work on 4/24/2024 revealed a hemoglobin 14.4, WBC of 7.4, platelet count 206,000. Sodium 140 potassium 4.1 BUN 8 creatinine 0.82.  B AST 15, ALT 13, alk phosphatase 104, total bili less than 0.2, albumin 4.3.  Blood work on 10/25/2023 revealed B12 level is 803.

## 2024-11-19 ENCOUNTER — TELEPHONE ENCOUNTER (OUTPATIENT)
Dept: URBAN - METROPOLITAN AREA CLINIC 113 | Facility: CLINIC | Age: 39
End: 2024-11-19

## 2024-11-20 ENCOUNTER — WEB ENCOUNTER (OUTPATIENT)
Dept: URBAN - METROPOLITAN AREA CLINIC 113 | Facility: CLINIC | Age: 39
End: 2024-11-20

## 2025-05-01 ENCOUNTER — OFFICE VISIT (OUTPATIENT)
Dept: URBAN - METROPOLITAN AREA CLINIC 113 | Facility: CLINIC | Age: 40
End: 2025-05-01
Payer: COMMERCIAL

## 2025-05-01 DIAGNOSIS — K58.0 IRRITABLE BOWEL SYNDROME WITH DIARRHEA: ICD-10-CM

## 2025-05-01 DIAGNOSIS — Z80.0 FAMILY HISTORY OF COLON CANCER IN FATHER: ICD-10-CM

## 2025-05-01 DIAGNOSIS — K21.9 GERD WITHOUT ESOPHAGITIS: ICD-10-CM

## 2025-05-01 DIAGNOSIS — E73.9 LACTOSE INTOLERANCE: ICD-10-CM

## 2025-05-01 PROCEDURE — 99213 OFFICE O/P EST LOW 20 MIN: CPT | Performed by: INTERNAL MEDICINE

## 2025-05-01 RX ORDER — PANTOPRAZOLE SODIUM 20 MG/1
1 TABLET TABLET, DELAYED RELEASE ORAL ONCE A DAY
Qty: 90 TABLET | Refills: 3 | OUTPATIENT
Start: 2025-04-30

## 2025-05-01 RX ORDER — GABAPENTIN 100 MG/1
1 CAPSULE CAPSULE ORAL ONCE A DAY
Status: ON HOLD | COMMUNITY

## 2025-05-01 RX ORDER — PANTOPRAZOLE SODIUM 40 MG/1
1 TABLET TABLET, DELAYED RELEASE ORAL ONCE A DAY
Qty: 90 | Refills: 3 | Status: ON HOLD | COMMUNITY

## 2025-05-01 RX ORDER — MELOXICAM 15 MG/1
1 TABLET TABLET ORAL ONCE A DAY
Status: ACTIVE | COMMUNITY

## 2025-05-01 RX ORDER — CALCIUM CARBONATE 500 MG/1
1 TABLET TABLET ORAL ONCE A DAY
Status: ON HOLD | COMMUNITY

## 2025-05-01 RX ORDER — ARMODAFINIL 50 MG/1
1 TABLET TABLET ORAL ONCE A DAY
Status: ON HOLD | COMMUNITY

## 2025-05-01 RX ORDER — TAMSULOSIN HYDROCHLORIDE 0.4 MG/1
1 CAPSULE CAPSULE ORAL ONCE A DAY
Status: ACTIVE | COMMUNITY

## 2025-05-01 RX ORDER — FLUTICASONE PROPIONATE 50 UG/1
1 SPRAY IN EACH NOSTRIL SPRAY, METERED NASAL ONCE A DAY
Status: ACTIVE | COMMUNITY

## 2025-05-01 RX ORDER — DEXTROAMPHETAMINE SULFATE, DEXTROAMPHETAMINE SACCHARATE, AMPHETAMINE SULFATE AND AMPHETAMINE ASPARTATE 7.5; 7.5; 7.5; 7.5 MG/1; MG/1; MG/1; MG/1
1 CAPSULE IN THE MORNING CAPSULE, EXTENDED RELEASE ORAL TWICE A DAY
Refills: 0 | Status: ACTIVE | COMMUNITY

## 2025-05-01 RX ORDER — ALUMINUM HYDROXIDE AND MAGNESIUM CARBONATE 95; 358 MG/15ML; MG/15ML
15 ML AFTER MEALS AND AT BEDTIME AS NEEDED LIQUID ORAL
Status: ACTIVE | COMMUNITY

## 2025-05-01 RX ORDER — PANTOPRAZOLE SODIUM 40 MG/1
1 TABLET TABLET, DELAYED RELEASE ORAL ONCE A DAY
Qty: 30 | Refills: 3 | Status: ON HOLD | COMMUNITY
Start: 2022-09-07

## 2025-05-01 RX ORDER — PANTOPRAZOLE SODIUM 20 MG/1
1 TABLET TABLET, DELAYED RELEASE ORAL ONCE A DAY
Qty: 90 TABLET | Refills: 3 | Status: ACTIVE | COMMUNITY

## 2025-05-01 RX ORDER — VALACYCLOVIR 1 G/1
1 TABLET TABLET, FILM COATED ORAL ONCE A DAY
Status: ON HOLD | COMMUNITY

## 2025-05-01 RX ORDER — TADALAFIL 5 MG/1
1 TABLET AS NEEDED TABLET, FILM COATED ORAL ONCE A DAY
Status: ACTIVE | COMMUNITY

## 2025-05-01 RX ORDER — DESIPRAMINE HYDROCHLORIDE 75 MG/1
1 TABLET TABLET ORAL ONCE A DAY
Qty: 90 | Refills: 3 | Status: ON HOLD | COMMUNITY
Start: 2019-09-25

## 2025-05-01 RX ORDER — SOLRIAMFETOL 150 MG/1
1 TABLET IN THE MORNING TABLET, FILM COATED ORAL ONCE A DAY
Status: ACTIVE | COMMUNITY

## 2025-05-01 NOTE — HPI-TODAY'S VISIT:
40-year-old with history of gastroesophageal reflux disease and irritable bowel syndrome with diarrhea.  He has had an extensive work-up in the past including urine negative for 5 HIAA, normal cortisol, gastrin calcitonin immunoglobulins.  VIP level was normal chromogranin A was less than 1.  He subsequently went to the [facility] in [place] and was evaluated by Dr. Girard.  I do not have the results of his evaluation.  Patient tells me that he was found to be lactose intolerant and has irritable bowel syndrome with diarrhea without any other diagnoses.  They have kept him on desipramine.  He did have 6 biotherapy sessions as well.   He did have a colonoscopy by Dr. Girard last year on 7/2021 which showed hemorrhoids but was otherwise unremarkable.  I have not been able to obtain that report.  He had banding of his hemorrhoids at the same time.  His father was diagnosed with colon cancer at age 63.  He is doing fairly well from a GI point of view.  He had to wean off his desipramine since he was placed on a medication for narcolepsy that would interact.  He was able to decrease his pantoprazole to 20 mg p.o. daily with no increase in his heartburn symptoms.  He has breakthrough maybe once every 2 weeks.  There is been no dysphagia.  Very minimal abdominal discomfort.  With no nausea or vomiting.  He does move his bowels every other day there is been no melena.  Occasionally he will see a tiny bit of blood on the toilet paper when he wipes.  He did have a kidney stone on 1/6/2025 and follows with his urologist Dr. Son.  They may plan lithotripsy.  In addition he contracted COVID and then subsequently had a sinus infection.  His wife and child had a cold last week and he now is coming down with a cold.  Blood work on 12/19/2024 revealed a sodium 141 potassium 3.9 BUN 8 creatinine 0.79.  AST 17, ALT 17, alk phosphatase 82, total bili less than 0.2, albumin 4.6, TSH is 2.19 with a free T4 of 1.25.  B12 level is 585.  Blood work on 4/24/2024 revealed a hemoglobin 14.4, WBC of 7.4, platelet count 206,000. Sodium 140 potassium 4.1 BUN 8 creatinine 0.82.  B AST 15, ALT 13, alk phosphatase 104, total bili less than 0.2, albumin 4.3.  Blood work on 10/25/2023 revealed B12 level is 803.

## 2025-05-29 ENCOUNTER — WEB ENCOUNTER (OUTPATIENT)
Dept: URBAN - METROPOLITAN AREA CLINIC 113 | Facility: CLINIC | Age: 40
End: 2025-05-29

## 2025-07-20 ENCOUNTER — WEB ENCOUNTER (OUTPATIENT)
Dept: URBAN - METROPOLITAN AREA CLINIC 113 | Facility: CLINIC | Age: 40
End: 2025-07-20

## 2025-07-20 RX ORDER — HYDROCORTISONE ACETATE 25 MG/1
1 SUPPOSITORY SUPPOSITORY RECTAL
Qty: 7 | Refills: 0 | OUTPATIENT
Start: 2025-07-22 | End: 2025-07-29

## 2025-07-23 ENCOUNTER — WEB ENCOUNTER (OUTPATIENT)
Dept: URBAN - METROPOLITAN AREA CLINIC 113 | Facility: CLINIC | Age: 40
End: 2025-07-23

## 2025-07-26 LAB
% SATURATION: 25
ABSOLUTE BASOPHILS: 32
ABSOLUTE EOSINOPHILS: 103
ABSOLUTE LYMPHOCYTES: 1728
ABSOLUTE MONOCYTES: 497
ABSOLUTE NEUTROPHILS: 3040
BASOPHILS: 0.6
EOSINOPHILS: 1.9
FERRITIN: 35
HEMATOCRIT: 46.3
HEMOGLOBIN: 14.4
IRON BINDING CAPACITY: 306
IRON, TOTAL: 75
LYMPHOCYTES: 32
MCH: 29.1
MCHC: 31.1
MCV: 93.7
MONOCYTES: 9.2
MPV: 10.5
NEUTROPHILS: 56.3
PLATELET COUNT: 215
RDW: 14.8
RED BLOOD CELL COUNT: 4.94
WHITE BLOOD CELL COUNT: 5.4

## 2025-07-29 ENCOUNTER — WEB ENCOUNTER (OUTPATIENT)
Dept: URBAN - METROPOLITAN AREA CLINIC 113 | Facility: CLINIC | Age: 40
End: 2025-07-29

## 2025-08-11 ENCOUNTER — WEB ENCOUNTER (OUTPATIENT)
Dept: URBAN - METROPOLITAN AREA CLINIC 113 | Facility: CLINIC | Age: 40
End: 2025-08-11